# Patient Record
Sex: FEMALE | Race: WHITE | Employment: FULL TIME | URBAN - METROPOLITAN AREA
[De-identification: names, ages, dates, MRNs, and addresses within clinical notes are randomized per-mention and may not be internally consistent; named-entity substitution may affect disease eponyms.]

---

## 2024-02-01 ENCOUNTER — OFFICE VISIT (OUTPATIENT)
Dept: FAMILY MEDICINE CLINIC | Facility: CLINIC | Age: 64
End: 2024-02-01
Payer: COMMERCIAL

## 2024-02-01 VITALS
HEART RATE: 87 BPM | RESPIRATION RATE: 18 BRPM | TEMPERATURE: 98 F | SYSTOLIC BLOOD PRESSURE: 118 MMHG | OXYGEN SATURATION: 97 % | HEIGHT: 66.14 IN | WEIGHT: 172 LBS | DIASTOLIC BLOOD PRESSURE: 78 MMHG | BODY MASS INDEX: 27.64 KG/M2

## 2024-02-01 DIAGNOSIS — Z86.39 HISTORY OF GRAVES' DISEASE: ICD-10-CM

## 2024-02-01 DIAGNOSIS — Z23 NEED FOR SHINGLES VACCINE: ICD-10-CM

## 2024-02-01 DIAGNOSIS — Z13.1 SCREENING FOR DIABETES MELLITUS (DM): ICD-10-CM

## 2024-02-01 DIAGNOSIS — Z78.0 POSTMENOPAUSAL: ICD-10-CM

## 2024-02-01 DIAGNOSIS — Z13.228 SCREENING FOR METABOLIC DISORDER: ICD-10-CM

## 2024-02-01 DIAGNOSIS — Z28.21 IMMUNIZATION DECLINED: ICD-10-CM

## 2024-02-01 DIAGNOSIS — Z28.21 IMMUNIZATION NOT CARRIED OUT BECAUSE OF PATIENT REFUSAL: ICD-10-CM

## 2024-02-01 DIAGNOSIS — Z00.00 ENCOUNTER FOR ANNUAL HEALTH EXAMINATION: Primary | ICD-10-CM

## 2024-02-01 DIAGNOSIS — Z11.59 NEED FOR HEPATITIS C SCREENING TEST: ICD-10-CM

## 2024-02-01 DIAGNOSIS — Z13.0 SCREENING, ANEMIA, DEFICIENCY, IRON: ICD-10-CM

## 2024-02-01 DIAGNOSIS — E55.9 VITAMIN D DEFICIENCY: ICD-10-CM

## 2024-02-01 DIAGNOSIS — Z12.31 VISIT FOR SCREENING MAMMOGRAM: ICD-10-CM

## 2024-02-01 DIAGNOSIS — Z13.6 SCREENING FOR CARDIOVASCULAR CONDITION: ICD-10-CM

## 2024-02-01 PROBLEM — E05.00 GRAVES DISEASE: Status: ACTIVE | Noted: 2018-03-05

## 2024-02-01 PROCEDURE — G0402 INITIAL PREVENTIVE EXAM: HCPCS | Performed by: FAMILY MEDICINE

## 2024-02-01 PROCEDURE — 3008F BODY MASS INDEX DOCD: CPT | Performed by: FAMILY MEDICINE

## 2024-02-01 PROCEDURE — 99203 OFFICE O/P NEW LOW 30 MIN: CPT | Performed by: FAMILY MEDICINE

## 2024-02-01 PROCEDURE — 3074F SYST BP LT 130 MM HG: CPT | Performed by: FAMILY MEDICINE

## 2024-02-01 PROCEDURE — 96160 PT-FOCUSED HLTH RISK ASSMT: CPT | Performed by: FAMILY MEDICINE

## 2024-02-01 PROCEDURE — 3078F DIAST BP <80 MM HG: CPT | Performed by: FAMILY MEDICINE

## 2024-02-01 RX ORDER — CRANBERRY CONC/C/BACILL COAG 250-30-15
TABLET ORAL
COMMUNITY

## 2024-02-01 RX ORDER — MULTIVIT-MIN/IRON/FOLIC ACID/K 18-600-40
CAPSULE ORAL
COMMUNITY

## 2024-02-01 RX ORDER — ESTRADIOL 0.1 MG/G
CREAM VAGINAL
COMMUNITY
Start: 2023-09-26 | End: 2024-02-01 | Stop reason: ALTCHOICE

## 2024-02-01 RX ORDER — ZOSTER VACCINE RECOMBINANT, ADJUVANTED 50 MCG/0.5
50 KIT INTRAMUSCULAR ONCE
Qty: 1 EACH | Refills: 1 | Status: SHIPPED | OUTPATIENT
Start: 2024-02-01 | End: 2024-02-01

## 2024-02-01 NOTE — PROGRESS NOTES
Subjective:   Brittney Sanabria is a 63 year old female who presents for a MA (Medicare Advantage) Supervisit (Once per calendar year) and good health with no chronic problems.     This 65-year-old female who is a new patient to my practice presents to the office for her first Medicare wellness exam and to establish care.    The patient states she has been very healthy.  She states 40 years ago, she was diagnosed with Graves' disease and was treated with medications for a brief period of time.  She never required any radioactive iodine treatments or surgeries.  She has had no swelling or tenderness to the thyroid.  She has never been on any thyroid supplements.  She has not had a recent thyroid level checked.    She does have a past history for vitamin D deficiency and is taking vitamin D 2000 units daily.  She would like to have her vitamin D level rechecked.    She denies any past history for hypertension, diabetes, heart disease, asthma, peptic ulcer disease, hepatitis, jaundice.    The patient has not received the shingles vaccine or the Prevnar 20 or flu shot for the season.  The patient is declining Prevnar 20 and flu shot today.  She will consider the shingles vaccine.    Her last mammogram was done in April 2023 at [facility].  She is scheduled for her gynecologic exam on 5/1/2024 and states she is due for her Pap smear then.    She does wear glasses and had seen her eye doctor in 12/23.  She denies any history for glaucoma.  She does not wear hearing aids.    She states she walks 7000 steps daily and tries to moderate her diet.    The patient states she does have a power of  for healthcare who is her .  She does have a living well and at this time, would like CPR.  She will bring copies of these forms to our office.    She has no other concerns today.    History/Other:   Fall Risk Assessment:   She has been screened for Falls and is low risk.      Cognitive Assessment:   She had a  completely normal cognitive assessment - see flowsheet entries     Functional Ability/Status:   Brittney Sanabria has a completely normal functional assessment. See flowsheet for details.      Depression Screening (PHQ-2/PHQ-9): PHQ-2 SCORE: 0  , done 2/1/2024   Last [place] Suicide Screening on 2/1/2024 was No Risk.         Advanced Directives:   She does have a Living Will but we do NOT have it on file in Epic.    She does have a POA but we do NOT have it on file in [place].    Patient has Advance Care Planning documents but we do not have a copy in EMR. Discussed Advanced Care Planning with patient and instructed patient to get our office a copy to be scanned into EMR.      Patient Active Problem List   Diagnosis    History of Graves' disease    Vitamin D deficiency     Allergies:  She is allergic to ciprofloxacin and sulfa antibiotics.    Current Medications:  Outpatient Medications Marked as Taking for the 2/1/24 encounter (Office Visit) with Tali Mcmahan, DO   Medication Sig    Cholecalciferol (VITAMIN D) 50 MCG (2000 UT) Oral Cap Take by mouth.    Cranberry-Vitamin C-Probiotic (AZO CRANBERRY) 250-30 MG Oral Tab Take by mouth.    Zoster Vac Recomb Adjuvanted (SHINGRIX) 50 MCG/0.5ML Intramuscular Recon Susp Inject 50 mcg into the muscle one time for 1 dose. Repeat once in 2-6 months.       Medical History:  She  has a past medical history of History of Graves' disease and Vitamin D deficiency.  Surgical History:  She  has a past surgical history that includes cholecystectomy (11/2017).   Family History:  Her family history includes Dementia in her mother; Heart Disorder in her father.  Social History:  She  reports that she has never smoked. She has never used smokeless tobacco. She reports that she does not drink alcohol and does not use drugs.    Tobacco:  She has never smoked tobacco.    CAGE Alcohol Screen:   CAGE screening score of 0 on 2/1/2024, showing low risk of alcohol abuse.      Patient Care  Team:  Tali Mcmahan DO as PCP - General (Family Medicine)    Review of Systems  Negative except except as stated above.    Objective:   Physical Exam    /78 (BP Location: Right arm, Patient Position: Sitting, Cuff Size: large)   Pulse 87   Temp 98 °F (36.7 °C)   Resp 18   Ht 5' 6.14\" (1.68 m)   Wt 172 lb (78 kg)   SpO2 97%   BMI 27.64 kg/m²  Estimated body mass index is 27.64 kg/m² as calculated from the following:    Height as of this encounter: 5' 6.14\" (1.68 m).    Weight as of this encounter: 172 lb (78 kg).    Medicare Hearing Assessment:   Hearing Screening    Time taken: 2/1/2024 11:26 AM  Entry User: Tali Mcmahan DO  Screening Method: Tuning Fork  Tuning Fork Result: Pass               Visual Acuity:   Right Eye Visual Acuity: Corrected Right Eye Chart Acuity: 20/40   Left Eye Visual Acuity: Corrected Left Eye Chart Acuity: 20/40   Both Eyes Visual Acuity: Corrected Both Eyes Chart Acuity: 20/40   Able To Tolerate Visual Acuity: Yes          General: WH/WN/WD, in NAD, A and O times 3  HEAD: Normocephalic, atraumatic  EYES: CONG, EOMI, conjunctiva normal, sclera anicteric  EARS: Tympanic membranes normal, EAC's normal.  NOSE: Turbninates normal, no bleeding noted.  PHARYNX:  No eythema or exudates, mucous membrane moist, airway patent.  NECK:  No cervical lymphadenopathy or thyromegaly, No bruits noted.  HEART: Regular rate and rhythm, no S3, S4 or murmur noted.  LUNGS: Clear to ausculation. No retractions or tachypnea noted.  BREASTS: deferred-to see her gyne on 5/1/2024  ABDOMEN: Soft, nontender, no guarding, rigidity or rebound, no masses or hepatosplenomegaly, normal bowel sounds in all four quadrants.  EXTREMITIES: No clubbing, cyanosis, edema noted. DTR's +2/4 in all 4 extremities. Motor +5/5 in all 4 extremities.  SKIN: Warm and dry. Multiple seborrheic keratosis on her back.  NEURO: Cr. N. II-XII intact, normal gait  PSYCH: Normal mood and affect.    Assessment & Plan:    Brittney Sanabria is a 63 year old female who presents for a Medicare Assessment.     1. Encounter for annual health examination (Primary)    Patient will bring a copy of her POA and living will for our records.    2. Screening, anemia, deficiency, iron    -     CBC With Differential With Platelet; Future; Expected date: 02/01/2024    3. Screening for metabolic disorder    -     Comp Metabolic Panel (14); Future; Expected date: 02/01/2024    4. Screening for cardiovascular condition    -     Lipid Panel; Future; Expected date: 02/01/2024    5. Screening for diabetes mellitus (DM)    -     Hemoglobin A1C; Future; Expected date: 02/01/2024    6. Need for hepatitis C screening test    -     HCV Antibody; Future; Expected date: 02/01/2024    7. Postmenopausal    -     Dexa Scan/Bone Density screening (Screening allowed every 2 years); Future; Expected date: 02/01/2024    8. Visit for screening mammogram    Patient prefers to have her mammogram done at [facility] were she had her last one done. She is given a copy of the mammogram order.    -     3D Mammogram Digital Screen, Bilateral (CPT=77067/34757); Future; Expected date: 02/01/2024    9. History of Graves' disease    Due for recheck on TSH.    -     TSH W Reflex To Free T4; Future; Expected date: 02/01/2024    10. Vitamin D deficiency    Due for recheck on vitamin D level.    -     Vitamin D, 1,25 Dihydroxy; Future; Expected date: 02/01/2024    11. Immunization not carried out because of patient refusal    -     Influenza Vaccine Refused (Order that documents the process)    12. Need for shingles vaccine    I discussed the shingles vaccine with the patient including side effects. Prescription given so patient may obtain her vaccine at her local pharmacy.    -     Shingrix; Inject 50 mcg into the muscle one time for 1 dose. Repeat once in 2-6 months.  Dispense: 1 each; Refill: 1    13. Immunization declined    Patient declined Prevnar 20 today.    The  patient indicates understanding of these issues and agrees to the plan.  Reinforced healthy diet, lifestyle, and exercise.      Return if symptoms worsen or fail to improve.     Tali Mcmahan DO, 2/1/2024     Supplementary Documentation:   General Health:  In the past six months, have you lost more than 10 pounds without trying?: 2 - No  Has your appetite been poor?: No  Type of Diet: Balanced  How does the patient maintain a good energy level?: Appropriate Exercise;Stretching  How would you describe your daily physical activity?: Moderate  How would you describe your current health state?: Good  How do you maintain positive mental well-being?: Social Interaction;Visiting Family;Visiting Friends  On a scale of 0 to 10, with 0 being no pain and 10 being severe pain, what is your pain level?: 0 - (None)  In the past six months, have you experienced urine leakage?: 0-No  At any time do you feel concerned for the safety/well-being of yourself and/or your children, in your home or elsewhere?: No  Have you had any immunizations at another office such as Influenza, Hepatitis B, Tetanus, or Pneumococcal?: No       Brittney Sanabria's SCREENING SCHEDULE   Tests on this list are recommended by your physician but may not be covered, or covered at this frequency, by your insurer.   Please check with your insurance carrier before scheduling to verify coverage.   PREVENTATIVE SERVICES FREQUENCY &  COVERAGE DETAILS LAST COMPLETION DATE   Diabetes Screening    Fasting Blood Sugar /  Glucose    One screening every 12 months if never tested or if previously tested but not diagnosed with pre-diabetes   One screening every 6 months if diagnosed with pre-diabetes No results found for: \"GLUCOSE\", \"GLU\"     Cardiovascular Disease Screening    Lipid Panel  Cholesterol  Lipoprotein (HDL)  Triglycerides Covered every 5 years for all Medicare beneficiaries without apparent signs or symptoms of cardiovascular disease No results found for:  \"CHOLEST\", \"HDL\", \"LDL\", \"LDLD\", \"LDLC\", \"TRIG\"      Electrocardiogram (EKG)   Covered if needed at Welcome to Medicare, and non-screening if indicated for medical reasons 11/29/2017      Ultrasound Screening for Abdominal Aortic Aneurysm (AAA) Covered once in a lifetime for one of the following risk factors    Men who are 65-75 years old and have ever smoked    Anyone with a family history -     Colorectal Cancer Screening  Covered for ages 50-85; only need ONE of the following:    Colonoscopy   Covered every 10 years    Covered every 2 years if patient is at high risk or previous colonoscopy was abnormal 12/30/2019    Health Maintenance   Topic Date Due    Colorectal Cancer Screening  12/30/2029       Flexible Sigmoidoscopy   Covered every 4 years -    Fecal Occult Blood Test Covered annually -   Bone Density Screening    Bone density screening    Covered every 2 years after age 65 if diagnosed with risk of osteoporosis or estrogen deficiency.    Covered yearly for long-term glucocorticoid medication use (Steroids) No results found for this or any previous visit.      Health Maintenance Due   Topic Date Due    DEXA Scan  Never done      Pap and Pelvic    Pap   Covered every 2 years for women at normal risk; Annually if at high risk 05/03/2021  Health Maintenance   Topic Date Due    Pap Smear  Never done       Chlamydia Annually if high risk -  No recommendations at this time   Screening Mammogram    Mammogram     Recommend annually for all female patients aged 40 and older    One baseline mammogram covered for patients aged 35-39 06/27/2022    Health Maintenance   Topic Date Due    Mammogram  Never done       Immunizations    Influenza Covered once per flu season  Please get every year -  No recommendations at this time    Pneumococcal Each vaccine ([id number] & [id number]) covered once after 65 Prevnar 13: -    [id number]: -     No recommendations at this time    Hepatitis B One screening covered for patients  with certain risk factors   -  No recommendations at this time    Tetanus Toxoid Not covered by Medicare Part B unless medically necessary (cut with metal); may be covered with your pharmacy prescription benefits -    Tetanus, Diptheria and Pertusis TD and TDaP Not covered by Medicare Part B -  No recommendations at this time    Zoster Not covered by Medicare Part B; may be covered with your pharmacy  prescription benefits -  Zoster Vaccines(1 of 2) Never done        Total time: 60 minutes precharting, H&P, plan of care of which 30 minutes was spent addressing her medical conditions.    This dictation was performed with a verbal recognition program (DRAGON) and it was checked for errors. It is possible that there are still dictated errors within this office note. If so, please bring any errors to my attention for an addendum. All efforts were made to ensure that this office note is accurate

## 2024-02-01 NOTE — PATIENT INSTRUCTIONS
Schedule your mammogram.    Schedule your bone density test. (DEXA scan).    Keep your appointment with your gynecologist as scheduled in May.    Consider getting your shingles vaccine. Take the prescription to the pharmacy so Medicare will pay for it.    Consider getting your flu and pneumonia vaccines.    I will contact you with your test results once available.            Brittney Sanabria's SCREENING SCHEDULE   Tests on this list are recommended by your physician but may not be covered, or covered at this frequency, by your insurer.   Please check with your insurance carrier before scheduling to verify coverage.   PREVENTATIVE SERVICES FREQUENCY &  COVERAGE DETAILS LAST COMPLETION DATE   Diabetes Screening    Fasting Blood Sugar /  Glucose    One screening every 12 months if never tested or if previously tested but not diagnosed with pre-diabetes   One screening every 6 months if diagnosed with pre-diabetes No results found for: \"GLUCOSE\", \"GLU\"     Cardiovascular Disease Screening    Lipid Panel  Cholesterol  Lipoprotein (HDL)  Triglycerides Covered every 5 years for all Medicare beneficiaries without apparent signs or symptoms of cardiovascular disease No results found for: \"CHOLEST\", \"HDL\", \"LDL\", \"LDLD\", \"LDLC\", \"TRIG\"      Electrocardiogram (EKG)   Covered if needed at Welcome to Medicare, and non-screening if indicated for medical reasons 11/29/2017      Ultrasound Screening for Abdominal Aortic Aneurysm (AAA) Covered once in a lifetime for one of the following risk factors    Men who are 65-75 years old and have ever smoked    Anyone with a family history -     Colorectal Cancer Screening  Covered for ages 50-85; only need ONE of the following:    Colonoscopy   Covered every 10 years    Covered every 2 years if patient is at high risk or previous colonoscopy was abnormal 12/30/2019    Health Maintenance   Topic Date Due    Colorectal Cancer Screening  12/30/2029       Flexible Sigmoidoscopy   Covered every 4  years -    Fecal Occult Blood Test Covered annually -   Bone Density Screening    Bone density screening    Covered every 2 years after age 65 if diagnosed with risk of osteoporosis or estrogen deficiency.    Covered yearly for long-term glucocorticoid medication use (Steroids) No results found for this or any previous visit.      Health Maintenance Due   Topic Date Due    DEXA Scan  Never done      Pap and Pelvic    Pap   Covered every 2 years for women at normal risk; Annually if at high risk 05/03/2021  Health Maintenance   Topic Date Due    Pap Smear  Never done       Chlamydia Annually if high risk -  No recommendations at this time   Screening Mammogram    Mammogram     Recommend annually for all female patients aged 40 and older    One baseline mammogram covered for patients aged 35-39 06/27/2022    Health Maintenance   Topic Date Due    Mammogram  Never done       Immunizations    Influenza Covered once per flu season  Please get every year -  Influenza Vaccine(1) Never done    Pneumococcal Each vaccine (Kvmucna90 & Swcuchmly64) covered once after 65 Prevnar 13: -    [id number]: -     Pneumococcal Vaccination(1 of 1 - PCV) Never done    Hepatitis B One screening covered for patients with certain risk factors   -  No recommendations at this time    Tetanus Toxoid Not covered by Medicare Part B unless medically necessary (cut with metal); may be covered with your pharmacy prescription benefits -    Tetanus, Diptheria and Pertusis TD and TDaP Not covered by Medicare Part B -  No recommendations at this time    Zoster Not covered by Medicare Part B; may be covered with your pharmacy  prescription benefits -  Zoster Vaccines(1 of 2) Never done

## 2024-02-02 ENCOUNTER — TELEPHONE (OUTPATIENT)
Dept: FAMILY MEDICINE CLINIC | Facility: CLINIC | Age: 64
End: 2024-02-02

## 2024-02-02 DIAGNOSIS — Z13.29 SCREENING FOR THYROID DISORDER: ICD-10-CM

## 2024-02-02 DIAGNOSIS — Z13.1 SCREENING FOR DIABETES MELLITUS: ICD-10-CM

## 2024-02-02 DIAGNOSIS — Z13.29 SCREENING FOR ENDOCRINE, METABOLIC AND IMMUNITY DISORDER: ICD-10-CM

## 2024-02-02 DIAGNOSIS — E55.9 VITAMIN D DEFICIENCY: ICD-10-CM

## 2024-02-02 DIAGNOSIS — Z13.0 SCREENING FOR ENDOCRINE, METABOLIC AND IMMUNITY DISORDER: ICD-10-CM

## 2024-02-02 DIAGNOSIS — Z13.0 SCREENING, ANEMIA, DEFICIENCY, IRON: ICD-10-CM

## 2024-02-02 DIAGNOSIS — Z13.6 SCREENING FOR HEART DISEASE: Primary | ICD-10-CM

## 2024-02-02 DIAGNOSIS — Z13.228 SCREENING FOR ENDOCRINE, METABOLIC AND IMMUNITY DISORDER: ICD-10-CM

## 2024-02-02 DIAGNOSIS — Z11.9 SCREENING EXAMINATION FOR INFECTIOUS DISEASE: ICD-10-CM

## 2024-02-02 NOTE — TELEPHONE ENCOUNTER
New lab orders placed for pt to have done at [facility].

## 2024-02-02 NOTE — TELEPHONE ENCOUNTER
Pt calling from [facility] to have labs drawn. Pt states she called yesterday to have them switched to [facility] but was advised her insurance requires her to go to [facility].    Pt has fasted and at [facility]. Please change orders to [facility].

## 2024-02-05 ENCOUNTER — PATIENT MESSAGE (OUTPATIENT)
Dept: FAMILY MEDICINE CLINIC | Facility: CLINIC | Age: 64
End: 2024-02-05

## 2024-02-05 PROBLEM — E78.00 HYPERCHOLESTEROLEMIA: Status: ACTIVE | Noted: 2024-02-05

## 2024-02-05 LAB
ABSOLUTE BASOPHILS: 53 CELLS/UL (ref 0–200)
ABSOLUTE EOSINOPHILS: 170 CELLS/UL (ref 15–500)
ABSOLUTE LYMPHOCYTES: 1632 CELLS/UL (ref 850–3900)
ABSOLUTE MONOCYTES: 382 CELLS/UL (ref 200–950)
ABSOLUTE NEUTROPHILS: 3063 CELLS/UL (ref 1500–7800)
ALBUMIN/GLOBULIN RATIO: 1.7 (CALC) (ref 1–2.5)
ALBUMIN: 4.5 G/DL (ref 3.6–5.1)
ALKALINE PHOSPHATASE: 64 U/L (ref 37–153)
ALT: 14 U/L (ref 6–29)
AST: 15 U/L (ref 10–35)
BASOPHILS: 1 %
BILIRUBIN, TOTAL: 0.5 MG/DL (ref 0.2–1.2)
BUN: 12 MG/DL (ref 7–25)
CALCIUM: 9.3 MG/DL (ref 8.6–10.4)
CARBON DIOXIDE: 26 MMOL/L (ref 20–32)
CHLORIDE: 104 MMOL/L (ref 98–110)
CHOL/HDLC RATIO: 3 (CALC)
CHOLESTEROL, TOTAL: 263 MG/DL
CREATININE: 0.72 MG/DL (ref 0.5–1.05)
EGFR: 93 ML/MIN/1.73M2
EOSINOPHILS: 3.2 %
GLOBULIN: 2.6 G/DL (CALC) (ref 1.9–3.7)
GLUCOSE: 93 MG/DL (ref 65–99)
HDL CHOLESTEROL: 89 MG/DL
HEMATOCRIT: 41.3 % (ref 35–45)
HEMOGLOBIN A1C: 5.3 % OF TOTAL HGB
HEMOGLOBIN: 14 G/DL (ref 11.7–15.5)
LDL-CHOLESTEROL: 158 MG/DL (CALC)
LYMPHOCYTES: 30.8 %
MCH: 31.7 PG (ref 27–33)
MCHC: 33.9 G/DL (ref 32–36)
MCV: 93.4 FL (ref 80–100)
MONOCYTES: 7.2 %
MPV: 10.4 FL (ref 7.5–12.5)
NEUTROPHILS: 57.8 %
NON-HDL CHOLESTEROL: 174 MG/DL (CALC)
PLATELET COUNT: 244 THOUSAND/UL (ref 140–400)
POTASSIUM: 4 MMOL/L (ref 3.5–5.3)
PROTEIN, TOTAL: 7.1 G/DL (ref 6.1–8.1)
RDW: 12.5 % (ref 11–15)
RED BLOOD CELL COUNT: 4.42 MILLION/UL (ref 3.8–5.1)
SODIUM: 139 MMOL/L (ref 135–146)
TRIGLYCERIDES: 68 MG/DL
TSH W/REFLEX TO FT4: 2.71 MIU/L (ref 0.4–4.5)
VITAMIN D, 25-OH, TOTAL: 30 NG/ML (ref 30–100)
WHITE BLOOD CELL COUNT: 5.3 THOUSAND/UL (ref 3.8–10.8)

## 2024-02-05 NOTE — TELEPHONE ENCOUNTER
Pt is taking Vit D3 100mcg once a day. Her Vit D level was 30. Pt would like to take 3 if OK with you? Please advise, thanks.

## 2024-02-05 NOTE — TELEPHONE ENCOUNTER
From: Brittney Sanabria  To: Tali Mcmahan  Sent: 2/5/2024 12:44 PM CST  Subject: Vitamin D    Since my Vitamin D is on the low normal range, should I increase my dose from 2 50 mcg gummies to 3 a day?    Thank you

## 2025-05-20 ENCOUNTER — TELEPHONE (OUTPATIENT)
Dept: FAMILY MEDICINE CLINIC | Facility: CLINIC | Age: 65
End: 2025-05-20

## 2025-05-20 NOTE — TELEPHONE ENCOUNTER
Left voicemail/sent [employer] message/letter reminding patient that they are due for MA Supervisit with Dr. Tali Mcmahan . Instructed the patient to return the call at [phone] or to schedule through [employer].

## 2025-08-04 ENCOUNTER — TELEPHONE (OUTPATIENT)
Dept: FAMILY MEDICINE CLINIC | Facility: CLINIC | Age: 65
End: 2025-08-04